# Patient Record
Sex: FEMALE | Race: WHITE
[De-identification: names, ages, dates, MRNs, and addresses within clinical notes are randomized per-mention and may not be internally consistent; named-entity substitution may affect disease eponyms.]

---

## 2021-08-22 ENCOUNTER — HOSPITAL ENCOUNTER (EMERGENCY)
Dept: HOSPITAL 75 - ER | Age: 36
Discharge: HOME | End: 2021-08-22
Payer: COMMERCIAL

## 2021-08-22 VITALS — WEIGHT: 293 LBS | BODY MASS INDEX: 50.02 KG/M2 | HEIGHT: 64.02 IN

## 2021-08-22 VITALS — SYSTOLIC BLOOD PRESSURE: 125 MMHG | DIASTOLIC BLOOD PRESSURE: 89 MMHG

## 2021-08-22 DIAGNOSIS — K64.9: Primary | ICD-10-CM

## 2021-08-22 LAB
AMORPH SED URNS QL MICRO: (no result) /LPF
APTT PPP: YELLOW S
BACTERIA #/AREA URNS HPF: (no result) /HPF
BASOPHILS # BLD AUTO: 0.1 10^3/UL (ref 0–0.1)
BASOPHILS NFR BLD AUTO: 1 % (ref 0–10)
BILIRUB UR QL STRIP: NEGATIVE
BUN/CREAT SERPL: 17
CALCIUM SERPL-MCNC: 9.9 MG/DL (ref 8.5–10.1)
CHLORIDE SERPL-SCNC: 106 MMOL/L (ref 98–107)
CO2 SERPL-SCNC: 23 MMOL/L (ref 21–32)
CREAT SERPL-MCNC: 0.83 MG/DL (ref 0.6–1.3)
EOSINOPHIL # BLD AUTO: 0.3 10^3/UL (ref 0–0.3)
EOSINOPHIL NFR BLD AUTO: 4 % (ref 0–10)
FIBRINOGEN PPP-MCNC: CLEAR MG/DL
GFR SERPLBLD BASED ON 1.73 SQ M-ARVRAT: 78 ML/MIN
GLUCOSE SERPL-MCNC: 92 MG/DL (ref 70–105)
GLUCOSE UR STRIP-MCNC: NEGATIVE MG/DL
HCT VFR BLD CALC: 42 % (ref 35–52)
HGB BLD-MCNC: 13.8 G/DL (ref 11.5–16)
KETONES UR QL STRIP: (no result)
LEUKOCYTE ESTERASE UR QL STRIP: NEGATIVE
LYMPHOCYTES # BLD AUTO: 3.7 10^3/UL (ref 1–4)
LYMPHOCYTES NFR BLD AUTO: 44 % (ref 12–44)
MANUAL DIFFERENTIAL PERFORMED BLD QL: NO
MCH RBC QN AUTO: 30 PG (ref 25–34)
MCHC RBC AUTO-ENTMCNC: 33 G/DL (ref 32–36)
MCV RBC AUTO: 91 FL (ref 80–99)
MONOCYTES # BLD AUTO: 0.6 10^3/UL (ref 0–1)
MONOCYTES NFR BLD AUTO: 7 % (ref 0–12)
NEUTROPHILS # BLD AUTO: 3.8 10^3/UL (ref 1.8–7.8)
NEUTROPHILS NFR BLD AUTO: 45 % (ref 42–75)
NITRITE UR QL STRIP: NEGATIVE
PH UR STRIP: 6 [PH] (ref 5–9)
PLATELET # BLD: 186 10^3/UL (ref 130–400)
PMV BLD AUTO: 11.2 FL (ref 9–12.2)
POTASSIUM SERPL-SCNC: 4.4 MMOL/L (ref 3.6–5)
PROT UR QL STRIP: NEGATIVE
RBC #/AREA URNS HPF: (no result) /HPF
SODIUM SERPL-SCNC: 139 MMOL/L (ref 135–145)
SP GR UR STRIP: >=1.03 (ref 1.02–1.02)
SQUAMOUS #/AREA URNS HPF: (no result) /HPF
WBC # BLD AUTO: 8.6 10^3/UL (ref 4.3–11)
WBC #/AREA URNS HPF: (no result) /HPF

## 2021-08-22 PROCEDURE — 36415 COLL VENOUS BLD VENIPUNCTURE: CPT

## 2021-08-22 PROCEDURE — 81000 URINALYSIS NONAUTO W/SCOPE: CPT

## 2021-08-22 PROCEDURE — 74177 CT ABD & PELVIS W/CONTRAST: CPT

## 2021-08-22 PROCEDURE — 84703 CHORIONIC GONADOTROPIN ASSAY: CPT

## 2021-08-22 PROCEDURE — 85025 COMPLETE CBC W/AUTO DIFF WBC: CPT

## 2021-08-22 PROCEDURE — 80048 BASIC METABOLIC PNL TOTAL CA: CPT

## 2021-08-22 NOTE — DIAGNOSTIC IMAGING REPORT
INDICATION: Pelvic pain, difficulty urinating.



TECHNIQUE: Multiple contiguous axial images were obtained through

the abdomen and pelvis after administration of intravenous

contrast. Auto Exposure Controls were utilized during the CT exam

to meet ALARA standards for radiation dose reduction. All CT

scans use one or more of the following dose optimizing

techniques: automated exposure control, MA and/or KvP adjustment

based on patient size and exam type or iterative reconstruction.



COMPARISON: There is no prior study for comparison.



The visualized portions of the lung bases are clear. There is no

pleural fluid collection. There is no free intraperitoneal air.



The liver shows mild diffuse low-density change compatible with

fatty infiltration. There is no focal liver lesion. The spleen,

adrenals and pancreas appear normal. The kidneys bilaterally are

unremarkable. There is no hydronephrosis or renal mass. There is

no retroperitoneal mass or adenopathy. There is no ascites or

abnormal fluid question. Visualized bowel loops show no sign of

obstruction or bowel wall thickening.



There is an irregular density in the left adnexa measuring about

2.1 cm. This can be followed sonographically. There is no free

fluid.



IMPRESSION: There is mild fatty infiltration of the liver. There

is no evidence of abscess or abnormal fluid collection. There is

a slightly irregular lesion in the left adnexa measuring 2.1 cm.

This finding can be followed sonographically. 



Dictated by: 



  Dictated on workstation # [id number]

## 2023-10-10 ENCOUNTER — HOSPITAL ENCOUNTER (OUTPATIENT)
Dept: HOSPITAL 75 - RAD | Age: 38
End: 2023-10-10
Attending: OBSTETRICS & GYNECOLOGY
Payer: COMMERCIAL

## 2023-10-10 DIAGNOSIS — N93.9: Primary | ICD-10-CM

## 2023-10-10 PROCEDURE — 76830 TRANSVAGINAL US NON-OB: CPT

## 2023-10-10 PROCEDURE — 76856 US EXAM PELVIC COMPLETE: CPT

## 2023-10-10 NOTE — DIAGNOSTIC IMAGING REPORT
PROCEDURE: 

US Non-ob pelvis comp/trans.



TECHNIQUE:  

Multiple Real-time grayscale images were obtained of the pelvis

in various projections endovaginally.



Transabdominal imaging was also performed.



INDICATION:  

Abnormal uterine bleeding.



COMPARISON: 

None.



FINDINGS: 

Uterus is anteverted and measures 7.8 x 4.2 x 3.5 cm. Myometrium

is diffusely heterogeneous. Endometrial stripe is within normal

limits and measures 2 mm in AP thickness. Visualized portions of

the cervix are unremarkable.



No adnexal masses or free fluid are seen. Ovaries have a normal

sonographic appearance. Right ovary measures 3.4 x 1.9 x 2.3 cm

and left measures 3.2 x 1.7 x 1.9 cm.



IMPRESSION:

Heterogeneous appearance of the uterine myometrium. This may be

exaggerated by poor acoustic window related to patient body

habitus. Fibroid uterus and adenomyosis may have a similar

appearance. Correlation with pelvic MRI may be of benefit.



Dictated by: 



  Dictated on workstation # [id number]